# Patient Record
Sex: MALE | Race: AMERICAN INDIAN OR ALASKA NATIVE
[De-identification: names, ages, dates, MRNs, and addresses within clinical notes are randomized per-mention and may not be internally consistent; named-entity substitution may affect disease eponyms.]

---

## 2020-03-13 ENCOUNTER — HOSPITAL ENCOUNTER (EMERGENCY)
Dept: HOSPITAL 5 - ED | Age: 45
LOS: 1 days | Discharge: HOME | End: 2020-03-14
Payer: SELF-PAY

## 2020-03-13 VITALS — DIASTOLIC BLOOD PRESSURE: 87 MMHG | SYSTOLIC BLOOD PRESSURE: 128 MMHG

## 2020-03-13 DIAGNOSIS — F12.10: ICD-10-CM

## 2020-03-13 DIAGNOSIS — F17.200: ICD-10-CM

## 2020-03-13 DIAGNOSIS — R56.9: Primary | ICD-10-CM

## 2020-03-13 PROCEDURE — 80053 COMPREHEN METABOLIC PANEL: CPT

## 2020-03-13 PROCEDURE — 80320 DRUG SCREEN QUANTALCOHOLS: CPT

## 2020-03-13 PROCEDURE — 80185 ASSAY OF PHENYTOIN TOTAL: CPT

## 2020-03-13 PROCEDURE — 85025 COMPLETE CBC W/AUTO DIFF WBC: CPT

## 2020-03-13 PROCEDURE — G0480 DRUG TEST DEF 1-7 CLASSES: HCPCS

## 2020-03-13 PROCEDURE — 70450 CT HEAD/BRAIN W/O DYE: CPT

## 2020-03-13 PROCEDURE — 36415 COLL VENOUS BLD VENIPUNCTURE: CPT

## 2020-03-14 LAB
ALBUMIN SERPL-MCNC: 4.8 G/DL (ref 3.9–5)
ALT SERPL-CCNC: 30 UNITS/L (ref 7–56)
BASOPHILS # (AUTO): 0.1 K/MM3 (ref 0–0.1)
BASOPHILS NFR BLD AUTO: 1 % (ref 0–1.8)
BUN SERPL-MCNC: 8 MG/DL (ref 9–20)
BUN/CREAT SERPL: 10 %
CALCIUM SERPL-MCNC: 8.8 MG/DL (ref 8.4–10.2)
EOSINOPHIL # BLD AUTO: 0 K/MM3 (ref 0–0.4)
EOSINOPHIL NFR BLD AUTO: 0.7 % (ref 0–4.3)
HCT VFR BLD CALC: 47.2 % (ref 35.5–45.6)
HEMOLYSIS INDEX: 3
HGB BLD-MCNC: 16 GM/DL (ref 11.8–15.2)
LYMPHOCYTES # BLD AUTO: 2 K/MM3 (ref 1.2–5.4)
LYMPHOCYTES NFR BLD AUTO: 34.1 % (ref 13.4–35)
MCHC RBC AUTO-ENTMCNC: 34 % (ref 32–34)
MCV RBC AUTO: 98 FL (ref 84–94)
MONOCYTES # (AUTO): 0.5 K/MM3 (ref 0–0.8)
MONOCYTES % (AUTO): 8.3 % (ref 0–7.3)
PLATELET # BLD: 247 K/MM3 (ref 140–440)
RBC # BLD AUTO: 4.83 M/MM3 (ref 3.65–5.03)

## 2020-03-14 NOTE — EMERGENCY DEPARTMENT REPORT
ED General Adult HPI





- General


Chief complaint: Seizure


Stated complaint: SEIZURE; MED R/F


Time Seen by Provider: 03/14/20 01:06


Source: patient


Mode of arrival: Ambulatory


Limitations: No Limitations





- History of Present Illness


Initial comments: 





The patient presents to the emergency department with a chief complaint of 

seizure activity today.  Patient states he takes Dilantin for seizures but has 

not had a for the last 2 days.  Patient denies any headache, shortness breath, 

or abdominal pain.


-: Sudden


Severity scale (0 -10): 0


Consistency: now resolved


Improves with: none


Worsens with: none


Associated Symptoms: denies other symptoms


Treatments Prior to Arrival: none





- Related Data


                                Home Medications











 Medication  Instructions  Recorded  Confirmed  Last Taken


 


Dilantin 100 mg PO DAILY 03/13/20 03/13/20 Unknown








                                  Previous Rx's











 Medication  Instructions  Recorded  Last Taken  Type


 


Phenytoin [Dilantin] 100 mg PO BID #60 capsule 03/14/20 Unknown Rx











                                    Allergies











Allergy/AdvReac Type Severity Reaction Status Date / Time


 


No Known Allergies Allergy   Verified 03/14/20 01:08














ED Review of Systems


ROS: 


Stated complaint: SEIZURE; MED R/F


Other details as noted in HPI





Comment: All other systems reviewed and negative


Constitutional: denies: chills, fever


Eyes: denies: eye pain, eye discharge, vision change


ENT: denies: ear pain, throat pain


Respiratory: denies: cough, shortness of breath, wheezing


Cardiovascular: denies: chest pain, palpitations


Endocrine: no symptoms reported


Gastrointestinal: denies: abdominal pain, nausea, diarrhea


Genitourinary: denies: urgency, dysuria


Musculoskeletal: denies: back pain, joint swelling, arthralgia


Skin: denies: rash, lesions


Neurological: denies: headache, weakness, paresthesias


Psychiatric: denies: anxiety, depression


Hematological/Lymphatic: denies: easy bleeding, easy bruising





ED Past Medical Hx





- Past Medical History


Previous Medical History?: Yes


Hx Seizures: Yes





- Surgical History


Past Surgical History?: No





- Social History


Smoking Status: Current Every Day Smoker


Substance Use Type: Alcohol, Marijuana





- Medications


Home Medications: 


                                Home Medications











 Medication  Instructions  Recorded  Confirmed  Last Taken  Type


 


Dilantin 100 mg PO DAILY 03/13/20 03/13/20 Unknown History


 


Phenytoin [Dilantin] 100 mg PO BID #60 capsule 03/14/20  Unknown Rx














ED Physical Exam





- General


Limitations: No Limitations


General appearance: alert, in no apparent distress





- Head


Head exam: Present: atraumatic, normocephalic





- Eye


Eye exam: Present: normal appearance, PERRL, EOMI





- ENT


ENT exam: Present: mucous membranes moist





- Neck


Neck exam: Present: normal inspection





- Respiratory


Respiratory exam: Present: normal lung sounds bilaterally.  Absent: respiratory 

distress





- Cardiovascular


Cardiovascular Exam: Present: regular rate, normal rhythm.  Absent: systolic 

murmur, diastolic murmur, rubs, gallop





- GI/Abdominal


GI/Abdominal exam: Present: soft, normal bowel sounds.  Absent: distended, 

tenderness





- Rectal


Rectal exam: Present: deferred





- Extremities Exam


Extremities exam: Present: normal inspection





- Back Exam


Back exam: Present: normal inspection





- Neurological Exam


Neurological exam: Present: alert, oriented X3, CN II-XII intact.  Absent: motor

sensory deficit





- Psychiatric


Psychiatric exam: Present: normal affect, normal mood





- Skin


Skin exam: Present: warm, dry, intact, normal color.  Absent: rash





ED Course





                                   Vital Signs











  03/13/20





  22:41


 


Temperature 98.0 F


 


Pulse Rate 94 H


 


Respiratory 18





Rate 


 


Blood Pressure 128/87


 


O2 Sat by Pulse 98





Oximetry 














ED Medical Decision Making





- Lab Data


Result diagrams: 


                                 03/13/20 23:48





                                 03/13/20 23:48








                                   Lab Results











  03/13/20 03/13/20 03/13/20 Range/Units





  23:48 23:48 23:48 


 


WBC  5.9    (4.5-11.0)  K/mm3


 


RBC  4.83    (3.65-5.03)  M/mm3


 


Hgb  16.0 H    (11.8-15.2)  gm/dl


 


Hct  47.2 H    (35.5-45.6)  %


 


MCV  98 H    (84-94)  fl


 


MCH  33 H    (28-32)  pg


 


MCHC  34    (32-34)  %


 


RDW  13.5    (13.2-15.2)  %


 


Plt Count  247    (140-440)  K/mm3


 


Lymph % (Auto)  34.1    (13.4-35.0)  %


 


Mono % (Auto)  8.3 H    (0.0-7.3)  %


 


Eos % (Auto)  0.7    (0.0-4.3)  %


 


Baso % (Auto)  1.0    (0.0-1.8)  %


 


Lymph #  2.0    (1.2-5.4)  K/mm3


 


Mono #  0.5    (0.0-0.8)  K/mm3


 


Eos #  0.0    (0.0-0.4)  K/mm3


 


Baso #  0.1    (0.0-0.1)  K/mm3


 


Seg Neutrophils %  55.9    (40.0-70.0)  %


 


Seg Neutrophils #  3.3    (1.8-7.7)  K/mm3


 


Sodium   141   (137-145)  mmol/L


 


Potassium   3.1 L   (3.6-5.0)  mmol/L


 


Chloride   103.8   ()  mmol/L


 


Carbon Dioxide   18 L   (22-30)  mmol/L


 


Anion Gap   22   mmol/L


 


BUN   8 L   (9-20)  mg/dL


 


Creatinine   0.8   (0.8-1.5)  mg/dL


 


Estimated GFR   > 60   ml/min


 


BUN/Creatinine Ratio   10   %


 


Glucose   98   ()  mg/dL


 


Calcium   8.8   (8.4-10.2)  mg/dL


 


Total Bilirubin   < 0.20   (0.1-1.2)  mg/dL


 


AST   50 H   (5-40)  units/L


 


ALT   30   (7-56)  units/L


 


Alkaline Phosphatase   108   ()  units/L


 


Total Protein   7.6   (6.3-8.2)  g/dL


 


Albumin   4.8   (3.9-5)  g/dL


 


Albumin/Globulin Ratio   1.7   %


 


Phenytoin    1.4 L  (10.0-20.0)  ug/mL


 


Plasma/Serum Alcohol     (0-0.07)  %














  03/13/20 Range/Units





  23:48 


 


WBC   (4.5-11.0)  K/mm3


 


RBC   (3.65-5.03)  M/mm3


 


Hgb   (11.8-15.2)  gm/dl


 


Hct   (35.5-45.6)  %


 


MCV   (84-94)  fl


 


MCH   (28-32)  pg


 


MCHC   (32-34)  %


 


RDW   (13.2-15.2)  %


 


Plt Count   (140-440)  K/mm3


 


Lymph % (Auto)   (13.4-35.0)  %


 


Mono % (Auto)   (0.0-7.3)  %


 


Eos % (Auto)   (0.0-4.3)  %


 


Baso % (Auto)   (0.0-1.8)  %


 


Lymph #   (1.2-5.4)  K/mm3


 


Mono #   (0.0-0.8)  K/mm3


 


Eos #   (0.0-0.4)  K/mm3


 


Baso #   (0.0-0.1)  K/mm3


 


Seg Neutrophils %   (40.0-70.0)  %


 


Seg Neutrophils #   (1.8-7.7)  K/mm3


 


Sodium   (137-145)  mmol/L


 


Potassium   (3.6-5.0)  mmol/L


 


Chloride   ()  mmol/L


 


Carbon Dioxide   (22-30)  mmol/L


 


Anion Gap   mmol/L


 


BUN   (9-20)  mg/dL


 


Creatinine   (0.8-1.5)  mg/dL


 


Estimated GFR   ml/min


 


BUN/Creatinine Ratio   %


 


Glucose   ()  mg/dL


 


Calcium   (8.4-10.2)  mg/dL


 


Total Bilirubin   (0.1-1.2)  mg/dL


 


AST   (5-40)  units/L


 


ALT   (7-56)  units/L


 


Alkaline Phosphatase   ()  units/L


 


Total Protein   (6.3-8.2)  g/dL


 


Albumin   (3.9-5)  g/dL


 


Albumin/Globulin Ratio   %


 


Phenytoin   (10.0-20.0)  ug/mL


 


Plasma/Serum Alcohol  0.24 H  (0-0.07)  %














- Radiology Data


Radiology results: report reviewed





- Medical Decision Making





Patient given Dilantin in the emergency department


Patient states he takes 100 mg of Dilantin twice daily


Critical care attestation.: 


If time is entered above; I have spent that time in minutes in the direct care 

of this critically ill patient, excluding procedure time.








ED Disposition


Clinical Impression: 


 Seizure





Disposition: DC-01 TO HOME OR SELFCARE


Is pt being admited?: No


Does the pt Need Aspirin: No


Condition: Stable


Instructions:  Recurrent Seizures Adult (ED)


Additional Instructions: 


return if worse


Prescriptions: 


Phenytoin [Dilantin] 100 mg PO BID #60 capsule


Referrals: 


[facility] [Primary Care Provider] - 3-5 Days


[facility] [Provider Group] - 3-5 Days


[facility] [Provider Group] - 3-5 Days


Time of Disposition: 01:45

## 2020-03-14 NOTE — CAT SCAN REPORT
CT head/brain wo con



INDICATION:

MAIN: SEIZURES.



TECHNIQUE:

All CT scans at this location are performed using CT dose reduction for ALARA by means of automated e
xposure control. 



COMPARISON:

None



FINDINGS:

Visualized paranasal and mastoid sinuses are clear. Ventricles are symmetrical and normal in size. No
 mass, hemorrhage or other significant abnormality.



IMPRESSION:

1. Negative study. 



Signer Name: Gasper Ma MD 

Signed: 3/14/2020 12:45 AM

Workstation Name: [id number]